# Patient Record
Sex: FEMALE | Race: WHITE | NOT HISPANIC OR LATINO | URBAN - METROPOLITAN AREA
[De-identification: names, ages, dates, MRNs, and addresses within clinical notes are randomized per-mention and may not be internally consistent; named-entity substitution may affect disease eponyms.]

---

## 2018-09-24 ENCOUNTER — HISTORICAL (OUTPATIENT)
Dept: ADMINISTRATIVE | Facility: HOSPITAL | Age: 43
End: 2018-09-24

## 2018-09-24 LAB
ABS NEUT (OLG): 6.3
ALBUMIN SERPL-MCNC: 4.1 GM/DL (ref 3.4–5)
ALBUMIN/GLOB SERPL: 1.71 {RATIO} (ref 1.5–2.5)
ALP SERPL-CCNC: 42 UNIT/L (ref 38–126)
ALT SERPL-CCNC: 18 UNIT/L (ref 7–52)
AST SERPL-CCNC: 11 UNIT/L (ref 15–37)
BILIRUB SERPL-MCNC: 0.4 MG/DL (ref 0.2–1)
BILIRUBIN DIRECT+TOT PNL SERPL-MCNC: 0.1 MG/DL (ref 0–0.5)
BILIRUBIN DIRECT+TOT PNL SERPL-MCNC: 0.3 MG/DL
BUN SERPL-MCNC: 8 MG/DL (ref 7–18)
CALCIUM SERPL-MCNC: 8.9 MG/DL (ref 8.5–10)
CHLORIDE SERPL-SCNC: 106 MMOL/L (ref 98–107)
CHOLEST SERPL-MCNC: 205 MG/DL (ref 0–200)
CHOLEST/HDLC SERPL: 5 {RATIO}
CO2 SERPL-SCNC: 26 MMOL/L (ref 21–32)
CREAT SERPL-MCNC: 0.62 MG/DL (ref 0.6–1.3)
ERYTHROCYTE [DISTWIDTH] IN BLOOD BY AUTOMATED COUNT: 12.6 % (ref 11.5–17)
EST. AVERAGE GLUCOSE BLD GHB EST-MCNC: 105 MG/DL
GLOBULIN SER-MCNC: 2.4 GM/DL (ref 1.2–3)
GLUCOSE SERPL-MCNC: 111 MG/DL (ref 74–106)
HBA1C MFR BLD: 5.3 % (ref 4.4–6.4)
HCT VFR BLD AUTO: 44.4 % (ref 37–47)
HDLC SERPL-MCNC: 41 MG/DL (ref 35–60)
HGB BLD-MCNC: 14.3 GM/DL (ref 12–16)
LDLC SERPL CALC-MCNC: 134 MG/DL (ref 0–129)
LYMPHOCYTES # BLD AUTO: 2.5 X10(3)/MCL (ref 0.6–3.4)
LYMPHOCYTES NFR BLD AUTO: 26.8 % (ref 13–40)
MCH RBC QN AUTO: 32.4 PG (ref 27–31.2)
MCHC RBC AUTO-ENTMCNC: 32 GM/DL (ref 32–36)
MCV RBC AUTO: 100 FL (ref 80–94)
MONOCYTES # BLD AUTO: 0.6 X10(3)/MCL (ref 0–1.8)
MONOCYTES NFR BLD AUTO: 5.9 % (ref 0.1–24)
NEUTROPHILS NFR BLD AUTO: 67.3 % (ref 47–80)
PLATELET # BLD AUTO: 253 X10(3)/MCL (ref 130–400)
PMV BLD AUTO: 9.9 FL
POTASSIUM SERPL-SCNC: 4.3 MMOL/L (ref 3.5–5.1)
PROT SERPL-MCNC: 6.5 GM/DL (ref 6.4–8.2)
RBC # BLD AUTO: 4.42 X10(6)/MCL (ref 4.2–5.4)
SODIUM SERPL-SCNC: 137 MMOL/L (ref 136–145)
TRIGL SERPL-MCNC: 160 MG/DL (ref 30–150)
TSH SERPL-ACNC: 1.19 MIU/ML (ref 0.35–4.94)
VLDLC SERPL CALC-MCNC: 32 MG/DL
WBC # SPEC AUTO: 9.4 X10(3)/MCL (ref 4.5–11.5)

## 2021-03-23 ENCOUNTER — HISTORICAL (OUTPATIENT)
Dept: ADMINISTRATIVE | Facility: HOSPITAL | Age: 46
End: 2021-03-23

## 2021-03-23 LAB
ABS NEUT (OLG): 7 X10(3)/MCL (ref 2.1–9.2)
ALBUMIN SERPL-MCNC: 4.4 GM/DL (ref 3.4–5)
ALBUMIN/GLOB SERPL: 1.91 {RATIO} (ref 1.5–2.5)
ALP SERPL-CCNC: 46 UNIT/L (ref 38–126)
ALT SERPL-CCNC: 16 UNIT/L (ref 7–52)
AST SERPL-CCNC: 13 UNIT/L (ref 15–37)
BILIRUB SERPL-MCNC: 0.5 MG/DL (ref 0.2–1)
BILIRUBIN DIRECT+TOT PNL SERPL-MCNC: 0.1 MG/DL (ref 0–0.5)
BILIRUBIN DIRECT+TOT PNL SERPL-MCNC: 0.4 MG/DL
BUN SERPL-MCNC: 10 MG/DL (ref 7–18)
CALCIUM SERPL-MCNC: 9.5 MG/DL (ref 8.5–10)
CHLORIDE SERPL-SCNC: 104 MMOL/L (ref 98–107)
CHOLEST SERPL-MCNC: 223 MG/DL (ref 0–200)
CHOLEST/HDLC SERPL: 5.9 {RATIO}
CO2 SERPL-SCNC: 23 MMOL/L (ref 21–32)
CREAT SERPL-MCNC: 0.72 MG/DL (ref 0.6–1.3)
ERYTHROCYTE [DISTWIDTH] IN BLOOD BY AUTOMATED COUNT: 12.5 % (ref 11.5–17)
EST CREAT CLEARANCE SER (OHS): 144.41 ML/MIN
GLOBULIN SER-MCNC: 2.3 GM/DL (ref 1.2–3)
GLUCOSE SERPL-MCNC: 110 MG/DL (ref 74–106)
HCT VFR BLD AUTO: 42.8 % (ref 37–47)
HDLC SERPL-MCNC: 38 MG/DL (ref 35–60)
HGB BLD-MCNC: 14.1 GM/DL (ref 12–16)
LDLC SERPL CALC-MCNC: 158 MG/DL (ref 0–129)
LYMPHOCYTES # BLD AUTO: 2.2 X10(3)/MCL (ref 0.6–3.4)
LYMPHOCYTES NFR BLD AUTO: 22.8 % (ref 13–40)
MCH RBC QN AUTO: 31.1 PG (ref 27–31.2)
MCHC RBC AUTO-ENTMCNC: 33 GM/DL (ref 32–36)
MCV RBC AUTO: 94 FL (ref 80–94)
MONOCYTES # BLD AUTO: 0.6 X10(3)/MCL (ref 0.1–1.3)
MONOCYTES NFR BLD AUTO: 6 % (ref 0.1–24)
NEUTROPHILS NFR BLD AUTO: 71.2 % (ref 47–80)
PLATELET # BLD AUTO: 318 X10(3)/MCL (ref 130–400)
PMV BLD AUTO: 9.6 FL (ref 9.4–12.4)
POTASSIUM SERPL-SCNC: 4.3 MMOL/L (ref 3.5–5.1)
PROT SERPL-MCNC: 6.7 GM/DL (ref 6.4–8.2)
RBC # BLD AUTO: 4.54 X10(6)/MCL (ref 4.2–5.4)
SODIUM SERPL-SCNC: 136 MMOL/L (ref 136–145)
TRIGL SERPL-MCNC: 224 MG/DL (ref 30–150)
TSH SERPL-ACNC: 1.21 MIU/ML (ref 0.35–4.94)
VLDLC SERPL CALC-MCNC: 44.8 MG/DL
WBC # SPEC AUTO: 9.8 X10(3)/MCL (ref 4.5–11.5)

## 2021-03-24 LAB
EST. AVERAGE GLUCOSE BLD GHB EST-MCNC: 108 MG/DL
HBA1C MFR BLD: 5.4 % (ref 4.4–6.4)

## 2022-04-09 ENCOUNTER — HISTORICAL (OUTPATIENT)
Dept: ADMINISTRATIVE | Facility: HOSPITAL | Age: 47
End: 2022-04-09

## 2022-04-26 VITALS
HEIGHT: 64 IN | DIASTOLIC BLOOD PRESSURE: 80 MMHG | OXYGEN SATURATION: 98 % | BODY MASS INDEX: 30.14 KG/M2 | WEIGHT: 176.56 LBS | SYSTOLIC BLOOD PRESSURE: 110 MMHG

## 2022-05-02 NOTE — HISTORICAL [facility] CERNER
This is a historical note converted from Jalen. Formatting and pictures may have been removed.  Please reference Jalen for original formatting and attached multimedia. Chief Complaint  cpx fasting  History of Present Illness  43?year old female presents for wellness visit.  Blood Pressure - 130/84  BMI - 32.996  Immunizations -?up to date  Breast Cancer Screening - up to date, Sees Dr. Foy  Cervical Cancer Screening -?up to date, Sees Dr. Foy  Diet - Average  Exercise - Swimming intermittently  Left-sided?submandibular lymphadenopathy present for approximately 4 months.? Nontender.? Size or consistency has not changed?over that timeframe. ?Denies any night sweats, unexplained weight loss, fever or chills.  Patient does currently smoke daily although?not ready to quit.  Review of Systems  Constitutional:?No weight loss, no fever, no fatigue, no chills, no night sweats,?no weakness  Eyes:?No blurred vision,?no redness,?no drainage,?no ocular?pain  HEENT:?No sore throat,?no ear pain, no sinus pressure, no nasal congestion, no rhinorrhea, no postnasal drip  Respiratory:?No cough, no wheezing, no sputum production, no shortness of breath  Cardiovascular:?No chest pain, no palpitations, no dyspnea on exertion,?no orthopnea  Gastrointestinal:?No nausea, no vomiting, no abdominal pain, no diarrhea,?no constipation, no melena,?no hematochezia  Genitourinary:?No dysuria, no hematuria, no frequency, no urgency, no incontinence, no discharge  Musculoskeletal:?No myalgias, no arthralgias, no weakness, no joint effusion, no edema  Integumentary:?No rashes, no hives, no itching, no lesions, no jaundice  Neurologic:?No headaches, no numbness, no tingling, no weakness, no dizziness  Psychiatric:?No anxiety, no irritability, no depression,?no suicidal ideations, no?homicidal ideations,?no delusions, no hallucinations  Endocrine:?No polyuria, no polydipsia, no polyphagia  Hematology:?No bruising, cervical lymphadenopathy,?no  paleness  ?  Physical Exam  Vitals & Measurements  HR:?80(Peripheral)? BP:?130/84?  HT:?162?cm? HT:?162?cm? WT:?86.5?kg? WT:?86.5?kg? BMI:?32.96?  General:?Well developed, Well-nourished, in No acute distress, A&O x 4  Eye:?PERRLA, EOMI, Clear conjunctiva, Eyelids unremarkable  Ears:?Bilateral EAC clear?and?Bilateral TM clear  Nose:? Mucosa normal, No rhinorrhea, No lesions  O/P:??No?erythema,?No tonsillar hypertrophy,?No tonsillar exudates,?No cobblestoning  Neck:?Soft, Nontender, No thyromegaly, Full range of motion, left-sided anterior cervical lymphadenopathy, No JVD  Cardiovascular:?Regular rate and rhythm, No murmurs, No gallops, No rubs  Respiratory:?Clear to auscultation bilaterally, No wheezes, No rhonchi, No?crackles  Abdomen:? Soft, Nontender, No hepatosplenomegaly, Normal and equal bowel sounds, No masses, No rebound, No guarding  Musculoskeletal:? No tenderness, FROM, No joint abnormality, No clubbing, No cyanosis,No?edema  Neurologic:? No motor or sensory deficits, Reflexes 2+ throughout, CN II-XII grossly intact  Integumentary:?No rashes or skin lesions  ?  Assessment/Plan  1.?Wellness examination  ?CBC, CMP, FLP, TSH today  Discussed importance of tobacco cessation,?maintaining a balanced diet?and regular exercise  Ordered:  Preventative Health Care Est 40-64 years [id number] , Wellness examination, [facility], 09/24/18 9:08:00 CDT  ?  2.?Immunization due  ?Influenza vaccine IM today  ?  3.?Left cervical lymphadenopathy  Ordered:  Schedule Diagnostics Study, Ultrasound soft tissue neck, Next available at [facility], 09/24/18 9:17:00 CDT, Left cervical lymphadenopathy  ?   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  Depression.: Mother.  Hypertension.: Mother, Father and Brother.  Immunizations  Vaccine Date Status   influenza virus vaccine, inactivated 09/24/2018 Given   tetanus/diphtheria/pertussis, acel(Tdap) 09/18/2017 Recorded   Health Maintenance  Health Maintenance  ???Pending?(in the next year)  ??? ??OverDue  ??? ? ? ?Influenza Vaccine due??and every?  ??? ??Due?  ??? ? ? ?ADL Screening due??09/24/18??and every 1??year(s)  ??? ? ? ?Alcohol Misuse Screening due??09/24/18??and every 1??year(s)  ??? ? ? ?Depression Screening due??09/24/18??and every?  ???Satisfied?(in the past 1 year)  ??? ??Satisfied?  ??? ? ? ?Blood Pressure Screening on??09/24/18.??Satisfied by Ashley Aquino  ??? ? ? ?Body Mass Index Check on??09/24/18.??Satisfied by Ashley Aquino  ??? ? ? ?Breast Cancer Screening on??10/16/17.??Satisfied by Ashley Aquino  ??? ? ? ?Cervical Cancer Screening on??10/09/17.??Satisfied by Winter Sandy  ??? ? ? ?Influenza Vaccine on??09/24/18.??Satisfied by Ashley Aquino  ??? ? ? ?Obesity Screening on??09/24/18.??Satisfied by Ashley Aquino  ??? ? ? ?Smoking Cessation on??09/24/18.??Satisfied by Ashley Aquino  ?  ?

## 2023-04-17 PROBLEM — G43.709 CHRONIC MIGRAINE WITHOUT AURA WITHOUT STATUS MIGRAINOSUS, NOT INTRACTABLE: Status: ACTIVE | Noted: 2023-04-17

## 2023-04-17 PROBLEM — I10 PRIMARY HYPERTENSION: Status: ACTIVE | Noted: 2023-04-17

## 2023-04-17 PROBLEM — E78.00 HYPERCHOLESTEROLEMIA: Status: ACTIVE | Noted: 2023-04-17

## 2023-04-17 PROBLEM — R60.0 EDEMA OF LOWER EXTREMITY: Status: ACTIVE | Noted: 2023-04-17

## 2023-04-17 PROBLEM — F41.1 GAD (GENERALIZED ANXIETY DISORDER): Status: ACTIVE | Noted: 2023-04-17

## 2023-04-17 PROBLEM — E66.9 OBESITY: Status: ACTIVE | Noted: 2023-04-17

## 2023-04-17 PROCEDURE — 82746 ASSAY OF FOLIC ACID SERUM: CPT | Performed by: FAMILY MEDICINE

## 2023-04-17 PROCEDURE — 82607 VITAMIN B-12: CPT | Performed by: FAMILY MEDICINE

## 2023-05-19 PROBLEM — G47.33 OSA (OBSTRUCTIVE SLEEP APNEA): Status: ACTIVE | Noted: 2023-05-19
